# Patient Record
Sex: FEMALE | Race: WHITE | NOT HISPANIC OR LATINO | Employment: FULL TIME | URBAN - METROPOLITAN AREA
[De-identification: names, ages, dates, MRNs, and addresses within clinical notes are randomized per-mention and may not be internally consistent; named-entity substitution may affect disease eponyms.]

---

## 2023-08-08 ENCOUNTER — TELEPHONE (OUTPATIENT)
Dept: FAMILY MEDICINE CLINIC | Facility: CLINIC | Age: 28
End: 2023-08-08

## 2023-08-08 NOTE — TELEPHONE ENCOUNTER
Caller: Mena Garcia    Relationship: Self    Best call back number: [phone]     What is the best time to reach you: ANY TIME    Who are you requesting to speak with (clinical staff, provider,  specific staff member): CLINICAL STAFF    What was the call regarding: PATIENT CALLED STATING SHE IS WAITING ON A NEW PATIENT APPOINTMENT WITH AN OB BUT SHE IS PREGNANT AND WANTING TO GET LAB WORK DONE AND WANTS TO KNOW IF [facility] WILL BE ABLE TO DO THIS OR IF SHE NEEDS TO WAIT TO SEE NEW OB. SHE HAS SEEN HER CURRENT OB BUT IS NOT HAPPY WITH THEM    PLEASE ADVISE    Is it okay if the provider responds through MyChart: NO, PHONE CALL

## 2023-08-09 NOTE — TELEPHONE ENCOUNTER
She will need to see her OB; they usually have specific labs they want at the first visit.  She needs to start an over the counter prenatal vitamin if not already taking one.